# Patient Record
Sex: FEMALE | Race: BLACK OR AFRICAN AMERICAN | NOT HISPANIC OR LATINO | Employment: FULL TIME | URBAN - METROPOLITAN AREA
[De-identification: names, ages, dates, MRNs, and addresses within clinical notes are randomized per-mention and may not be internally consistent; named-entity substitution may affect disease eponyms.]

---

## 2022-05-17 DIAGNOSIS — R06.02 SHORTNESS OF BREATH: Primary | ICD-10-CM

## 2022-08-19 ENCOUNTER — PROCEDURE VISIT (OUTPATIENT)
Dept: RESPIRATORY THERAPY | Facility: HOSPITAL | Age: 27
End: 2022-08-19
Attending: NURSE PRACTITIONER
Payer: MEDICAID

## 2022-08-19 DIAGNOSIS — R06.02 SHORTNESS OF BREATH: ICD-10-CM

## 2022-08-19 LAB
DLCO ADJ PRE: 19.34 ML/(MIN*MMHG) (ref 22.49–33.95)
DLCO SINGLE BREATH LLN: 22.49
DLCO SINGLE BREATH PRE REF: 68.6 %
DLCO SINGLE BREATH REF: 28.22
DLCOC SBVA LLN: 4.02
DLCOC SBVA PRE REF: 95.7 %
DLCOC SBVA REF: 5.61
DLCOC SINGLE BREATH LLN: 22.49
DLCOC SINGLE BREATH PRE REF: 68.6 %
DLCOC SINGLE BREATH REF: 28.22
DLCOCSBVAULN: 7.19
DLCOCSINGLEBREATHULN: 33.95
DLCOSINGLEBREATHULN: 33.95
DLCOVA LLN: 4.02
DLCOVA PRE REF: 95.7 %
DLCOVA PRE: 5.36 ML/(MIN*MMHG*L) (ref 4.02–7.19)
DLCOVA REF: 5.61
DLCOVAULN: 7.19
DLVAADJ PRE: 5.36 ML/(MIN*MMHG*L) (ref 4.02–7.19)
ERV LLN: -16448.67
ERV PRE REF: 47.1 %
ERV REF: 1.33
ERVULN: ABNORMAL
FEF 25 75 LLN: 2.02
FEF 25 75 PRE REF: 119.4 %
FEF 25 75 REF: 3.39
FEV1 FVC LLN: 76
FEV1 FVC PRE REF: 103 %
FEV1 FVC REF: 87
FEV1 LLN: 2.26
FEV1 PRE REF: 97.3 %
FEV1 REF: 2.89
FRCPLETH LLN: 1.88
FRCPLETH PREREF: 69.5 %
FRCPLETH REF: 2.7
FRCPLETHULN: 3.52
FVC LLN: 2.61
FVC PRE REF: 94 %
FVC REF: 3.33
IVC PRE: 2.68 L (ref 2.61–4.06)
IVC SINGLE BREATH LLN: 2.61
IVC SINGLE BREATH PRE REF: 80.6 %
IVC SINGLE BREATH REF: 3.33
IVCSINGLEBREATHULN: 4.06
LLN IC: -16447.57
MVV LLN: 111
MVV PRE REF: 33 %
MVV REF: 130
PEF LLN: 4.97
PEF PRE REF: 73 %
PEF REF: 7.01
PRE DLCO: 19.34 ML/(MIN*MMHG) (ref 22.49–33.95)
PRE ERV: 0.63 L (ref -16448.67–16451.33)
PRE FEF 25 75: 4.04 L/S (ref 2.02–4.76)
PRE FET 100: 6.66 SEC
PRE FEV1 FVC: 89.65 % (ref 75.71–98.31)
PRE FEV1: 2.81 L (ref 2.26–3.51)
PRE FRC PL: 1.88 L
PRE FVC: 3.13 L (ref 2.61–4.06)
PRE IC: 1.99 L (ref -16447.57–16452.43)
PRE MVV: 43 L/MIN (ref 110.6–149.64)
PRE PEF: 5.12 L/S (ref 4.97–9.05)
PRE REF IC: 81.9 %
PRE RV: 0.56 L (ref 0.79–1.94)
PRE TLC: 3.87 L (ref 4.05–6.02)
PRE VTG: 1.99 L
RAW PRE REF: 75.3 %
RAW PRE: 2.3 CMH2O*S/L (ref 3.06–3.06)
RAW REF: 3.06
REF IC: 2.43
RV LLN: 0.79
RV PRE REF: 41.1 %
RV REF: 1.37
RVTLC LLN: 18
RVTLC PRE REF: 53 %
RVTLC PRE: 14.56 % (ref 17.87–37.05)
RVTLC REF: 27
RVTLCULN: 37
RVULN: 1.94
SGAW PRE REF: 226.9 %
SGAW PRE: 0.23 1/(CMH2O*S) (ref 0.1–0.1)
SGAW REF: 0.1
TLC LLN: 4.05
TLC PRE REF: 76.8 %
TLC REF: 5.03
TLC ULN: 6.02
ULN IC: ABNORMAL
VA PRE: 3.61 L (ref 4.88–4.88)
VA SINGLE BREATH PRE REF: 73.9 %
VA SINGLE BREATH REF: 4.88
VC LLN: 2.61
VC PRE REF: 99.2 %
VC PRE: 3.3 L (ref 2.61–4.06)
VC REF: 3.33
VC ULN: 4.06

## 2022-08-19 PROCEDURE — 99900035 HC TECH TIME PER 15 MIN (STAT)

## 2022-08-19 PROCEDURE — 94729 DIFFUSING CAPACITY: CPT

## 2022-08-19 PROCEDURE — 94727 GAS DIL/WSHOT DETER LNG VOL: CPT

## 2022-08-19 PROCEDURE — 94010 BREATHING CAPACITY TEST: CPT

## 2022-12-26 ENCOUNTER — HOSPITAL ENCOUNTER (EMERGENCY)
Facility: HOSPITAL | Age: 27
Discharge: HOME | End: 2022-12-26
Attending: STUDENT IN AN ORGANIZED HEALTH CARE EDUCATION/TRAINING PROGRAM
Payer: MEDICAID

## 2022-12-26 VITALS
SYSTOLIC BLOOD PRESSURE: 143 MMHG | WEIGHT: 212 LBS | OXYGEN SATURATION: 100 % | BODY MASS INDEX: 36.19 KG/M2 | RESPIRATION RATE: 16 BRPM | HEART RATE: 88 BPM | HEIGHT: 64 IN | TEMPERATURE: 99 F | DIASTOLIC BLOOD PRESSURE: 82 MMHG

## 2022-12-26 DIAGNOSIS — A08.4 VIRAL GASTROENTERITIS: Primary | ICD-10-CM

## 2022-12-26 PROCEDURE — 25000003 PHARM REV CODE 250

## 2022-12-26 PROCEDURE — 99283 EMERGENCY DEPT VISIT LOW MDM: CPT

## 2022-12-26 RX ORDER — ONDANSETRON 4 MG/1
4 TABLET, ORALLY DISINTEGRATING ORAL EVERY 6 HOURS PRN
Qty: 20 TABLET | Refills: 0 | Status: SHIPPED | OUTPATIENT
Start: 2022-12-26 | End: 2023-01-05

## 2022-12-26 RX ORDER — ONDANSETRON 4 MG/1
4 TABLET, ORALLY DISINTEGRATING ORAL
Status: COMPLETED | OUTPATIENT
Start: 2022-12-26 | End: 2022-12-26

## 2022-12-26 RX ORDER — BUDESONIDE AND FORMOTEROL FUMARATE DIHYDRATE 80; 4.5 UG/1; UG/1
AEROSOL RESPIRATORY (INHALATION)
COMMUNITY
Start: 2022-10-14

## 2022-12-26 RX ADMIN — ONDANSETRON 4 MG: 4 TABLET, ORALLY DISINTEGRATING ORAL at 05:12

## 2022-12-26 NOTE — Clinical Note
"Jaida Manningchandra Leblanc was seen and treated in our emergency department on 12/26/2022.  She may return to work on 12/27/2022.  Alden Norris transported Mrs. Leblanc to today's visit.      If you have any questions or concerns, please don't hesitate to call.      Azul"

## 2022-12-26 NOTE — Clinical Note
"Jaida Peralta ([place])isco was seen and treated in our emergency department on 12/26/2022.  She may return to work on 12/28/2022.       If you have any questions or concerns, please don't hesitate to call.      Azul"

## 2022-12-26 NOTE — ED PROVIDER NOTES
Encounter Date: 12/26/2022       History     Chief Complaint   Patient presents with    Emesis     Vomiting and diarrhea     Patient is a 27 year old male who presents to ER with c/o nausea, vomiting, diarrhea x 1 day. Patient concerned she ate some bad food. States that she has had 2 episodes of vomiting today and 1 episode of diarrhea. She denies abdominal pain, fever, or chills.     The history is provided by the patient. No  was used.   Emesis   This is a new problem. The current episode started today. The problem occurs 2 - 4 times per day. The problem has been unchanged. Associated symptoms include diarrhea. Pertinent negatives include no abdominal pain, no chills, no cough, no fever, no sweats and no URI. Risk factors include suspect food intake.   Review of patient's allergies indicates:  No Known Allergies  History reviewed. No pertinent past medical history.  History reviewed. No pertinent surgical history.  History reviewed. No pertinent family history.  Social History     Tobacco Use    Smoking status: Never    Smokeless tobacco: Never     Review of Systems   Constitutional:  Negative for chills and fever.   HENT:  Negative for congestion, postnasal drip, rhinorrhea and sore throat.    Respiratory:  Negative for cough and shortness of breath.    Cardiovascular:  Negative for chest pain.   Gastrointestinal:  Positive for diarrhea, nausea and vomiting. Negative for abdominal pain and blood in stool.   Genitourinary:  Negative for dysuria, flank pain, frequency and urgency.   Musculoskeletal:  Negative for back pain.   Skin:  Negative for rash.   Neurological:  Negative for dizziness, weakness and numbness.   Hematological:  Does not bruise/bleed easily.   Psychiatric/Behavioral:  Negative for behavioral problems.    All other systems reviewed and are negative.    Physical Exam     Initial Vitals [12/26/22 1700]   BP Pulse Resp Temp SpO2   (!) 143/82 88 16 98.8 °F (37.1 °C) 100 %       MAP       --         Physical Exam    Nursing note and vitals reviewed.  Constitutional: Vital signs are normal. She appears well-developed and well-nourished.  Non-toxic appearance. She does not have a sickly appearance. She does not appear ill.   HENT:   Head: Normocephalic.   Right Ear: Hearing and tympanic membrane normal.   Left Ear: Hearing and tympanic membrane normal.   Nose: Nose normal.   Mouth/Throat: Uvula is midline, oropharynx is clear and moist and mucous membranes are normal.   Cardiovascular:  Regular rhythm, normal heart sounds and normal pulses.           Pulmonary/Chest: Effort normal and breath sounds normal.   Abdominal: Abdomen is soft. Bowel sounds are normal. There is no abdominal tenderness.     Lymphadenopathy:     She has no cervical adenopathy.   Neurological: She is alert. GCS eye subscore is 4. GCS verbal subscore is 5. GCS motor subscore is 6.   Skin: Skin is warm, dry and intact. Capillary refill takes less than 2 seconds.       ED Course   Procedures  Labs Reviewed - No data to display       Imaging Results    None          Medications   ondansetron disintegrating tablet 4 mg (4 mg Oral Given 12/26/22 1725)     Medical Decision Making:   Initial Assessment:   Awake and alert, NAD.  Differential Diagnosis:   Viral gastroenteritis, urinary tract infection, pregnancy   ED Management:  Patient is a 27 year old female who presents to ER with n/v/d x 1 day. She had two episodes of vomiting and one episode of diarrhea. She declined UA and pregnancy test here in ER today. She was given zofran PO with complete resolution of the n/v. Abdomen is non tender and non distended. She states that the diarrhea has subsided. She is tolerating PO here in ER with no difficulty. Discussed gastroenteritis with patient. All questions and concerns answered. Will dc home.            ED Course as of 12/26/22 1847   Mon Dec 26, 2022   1842 Patients nausea has resolved. She is tolerating PO and ready for  discharge home.  [LM]      ED Course User Index  [LM] Fritz Lainez NP                 Clinical Impression:   Final diagnoses:  [A08.4] Viral gastroenteritis (Primary)        ED Disposition Condition    Discharge Stable          ED Prescriptions       Medication Sig Dispense Start Date End Date Auth. Provider    ondansetron (ZOFRAN-ODT) 4 MG TbDL Take 1 tablet (4 mg total) by mouth every 6 (six) hours as needed (Nausea). 20 tablet 12/26/2022 1/5/2023 Fritz Lainez NP          Follow-up Information       Follow up With Specialties Details Why Contact Info    Jaelyn Canales NP [facility] Medicine Schedule an appointment as soon as possible for a visit  As needed, If symptoms worsen [address] [facility] [address] [phone]               Fritz Lainez NP  12/26/22 [id number]

## 2022-12-26 NOTE — Clinical Note
"Jaida Manningchandra Leblanc was seen and treated in our emergency department on 12/26/2022.  She may return to work on 12/27/2022.  Alden Norris transported Mrs. Leblanc to today's visit.      If you have any questions or concerns, please don't hesitate to call.      Azul NIELSEN    "

## 2022-12-26 NOTE — Clinical Note
"Jaida Leblanc ([place]) was seen and treated in our emergency department on 12/26/2022.  She may return to work on 12/28/2022.       If you have any questions or concerns, please don't hesitate to call.      Azul NIELSEN    "